# Patient Record
Sex: FEMALE | Race: OTHER
[De-identification: names, ages, dates, MRNs, and addresses within clinical notes are randomized per-mention and may not be internally consistent; named-entity substitution may affect disease eponyms.]

---
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
## 2018-01-01 ENCOUNTER — HOSPITAL ENCOUNTER (EMERGENCY)
Dept: HOSPITAL 62 - ER | Age: 18
Discharge: HOME | End: 2018-01-01
Payer: SELF-PAY

## 2018-01-01 VITALS — DIASTOLIC BLOOD PRESSURE: 69 MMHG | SYSTOLIC BLOOD PRESSURE: 117 MMHG
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
## 2018-01-01 DIAGNOSIS — S62.337A: Primary | ICD-10-CM

## 2018-01-01 PROCEDURE — 99283 EMERGENCY DEPT VISIT LOW MDM: CPT

## 2018-01-01 NOTE — RADIOLOGY REPORT (SQ)
EXAM DESCRIPTION:  HAND LEFT 3 VIEWS



COMPLETED DATE/TIME:  1/1/2018 12:25 pm



REASON FOR STUDY:  punched wall, LMP 12/09/17--might be pregnant



COMPARISON:  None.



EXAM PARAMETERS:  NUMBER OF VIEWS: Three views.

TECHNIQUE: AP, lateral and oblique  radiographic images acquired of the left hand.

LIMITATIONS: None.



FINDINGS:  MINERALIZATION: Normal.

BONES: Mildly displaced and angulated fracture 5th metacarpal head/neck.  Bones otherwise intact.

JOINTS: No effusions.

SOFT TISSUES: Associated soft tissue swelling.

OTHER: No other significant finding.



IMPRESSION:  5TH METACARPAL HEAD/ NECK FRACTURE AS ABOVE.



TECHNICAL DOCUMENTATION:  JOB ID:  [id number]

 2011 [employer]- All Rights Reserved

## 2018-01-01 NOTE — ER DOCUMENT REPORT
ED Hand/Wrist Injury





- General


Chief Complaint: Hand Injury


Stated Complaint: HAND PAIN


Time Seen by Provider: 01/01/18 11:53


Mode of Arrival: Ambulatory


Information source: Patient, [facility] Records


Notes: 





17-year-old female patient reports punching a wall several times about 1 AM 

this morning with her left hand.  She has pain and swelling over the fourth and 

fifth metacarpal heads.  She is concerned about a fracture.  There is swelling 

bruising and pain over the metacarpal heads.


Patient reports her last menstrual period was 12/9/2017 and there is a 

possibility she may have become pregnant.


TRAVEL OUTSIDE OF THE [place] IN LAST 30 DAYS: No





- Related Data


Allergies/Adverse Reactions: 


 





No Known Allergies Allergy (Verified 01/01/18 11:45)


 











Past Medical History





- General


Information source: Patient, [facility] Records





- Social History


Smoking Status: Never Smoker


Cigarette use (# per day): No


Chew tobacco use (# tins/day): No


Smoking Education Provided: No


Frequency of alcohol use: Occasional


Drug Abuse: None


Occupation: Unemployed


Lives with: Friend


Family History: Reviewed & Not Pertinent





- Medical History


Medical History: Negative


Surgical Hx: Negative





Review of Systems





- Review of Systems


Constitutional: No symptoms reported


EENT: No symptoms reported


Cardiovascular: No symptoms reported


Respiratory: No symptoms reported


Gastrointestinal: No symptoms reported


Genitourinary: No symptoms reported


Female Genitourinary: See HPI, Last menstrual period - 12/09/2017


Musculoskeletal: See HPI


Skin: No symptoms reported


Hematologic/Lymphatic: No symptoms reported


Neurological/Psychological: No symptoms reported





Physical Exam





- Vital signs


Vitals: 


 











Temp Pulse Resp BP Pulse Ox


 


 98.4 F   98 H  18   120/75   100 


 


 01/01/18 11:56  01/01/18 11:56  01/01/18 11:56  01/01/18 11:56  01/01/18 11:56














- General


General appearance: Appears well, Alert


In distress: None





- HEENT


Head: Normocephalic, Atraumatic


Eyes: Normal


Pupils: PERRL


Neck: Normal





- Respiratory


Respiratory status: No respiratory distress





- Cardiovascular


Rhythm: Regular


Heart sounds: Normal auscultation


Murmur: No





- Abdominal


Inspection: Normal





- Back


Back: Normal





- Extremities


General upper extremity: Other - The left hand has pain, swelling, ecchymosis 

over the dorsal fourth and fifth metacarpal heads.  There may be some 

depression at the fifth metacarpal head.  X-rays will show if this is the case.


General lower extremity: Normal inspection





- Neurological


Neuro grossly intact: Yes





- Psychological


Associated symptoms: Normal affect, Normal mood





- Skin


Skin Temperature: Warm


Skin Moisture: Dry


Skin Color: Normal





Course





- Re-evaluation


Re-evalutation: 





01/01/18 13:34


The ulnar gutter splint was placed on the left forearm wrist and hand by the 

PCT.  Fits well.  The fingertips have good sensation capillary refill.  Patient 

states it is quite comfortable and makes the fracture feel better.


A sling was placed and that provides support and elevation of the hand and also 

increases the patient's comfort.





- Vital Signs


Vital signs: 


 











Temp Pulse Resp BP Pulse Ox


 


 98.4 F   98 H  18   120/75   100 


 


 01/01/18 11:56  01/01/18 11:56  01/01/18 11:56  01/01/18 11:56  01/01/18 11:56














- Diagnostic Test


Radiology reviewed: Image reviewed, Reports reviewed - Minimally angulated 

fifth metacarpal neck fracture





Discharge





- Discharge


Clinical Impression: 


Boxers fracture


Qualifiers:


 Encounter type: initial encounter Fracture type: closed Qualified Code(s): 

S62.339A - Displaced fracture of neck of unspecified metacarpal bone, initial 

encounter for closed fracture





Condition: Stable


Disposition: HOME, SELF-CARE


Additional Instructions: 


Fractured Fifth Metacarpal (Boxer's)





     You have a fracture of the fifth metacarpal bone in the hand, often called 

a Boxer's Fracture.  The fracture is usually caused by striking the knuckle 

against a hard surface -- such as hitting a wall with the fist.


     This fracture heals well.  Some degree of angle in the fracture is 

perfectly acceptable, resulting in only a slightly rounder knuckle. Your 

physician has determined whether your fracture could benefit from "setting", 

and has outlined a treatment plan for you.


     The usual treatment is splinting for four to six weeks -- a cast is not 

usually necessary.  At first, the injury should be elevated and ice packed.


     Contact the doctor at once if swelling or pain becomes severe, or if 

numbness develops.





////////////////////////////////////////////////////////////////////////////////

////////////////////////////////////////////////////////////////////////////////





Keep the splint clean and dry.


Elevate the hand as much possible.


Take the pain medications as prescribed if Tylenol and ibuprofen does not 

control the pain.


Follow-up with [facility]--call tomorrow for an appointment 

in the next 3-5 days.





RETURN TO THE EMERGENCY ROOM IF ANY NEW OR WORSENING SYMPTOMS.








Prescriptions: 


Hydrocodone/Acetaminophen [Hydrocodon-Acetaminophen 5-325] 1 each PO ASDIR PRN #

15 tablet


 PRN Reason: 


Referrals: 


[facility] [Provider Group] - Follow up in 3-5 days (Call 

on Tuesday for an appointment.)

## 2018-01-20 ENCOUNTER — HOSPITAL ENCOUNTER (EMERGENCY)
Dept: HOSPITAL 62 - ER | Age: 18
Discharge: HOME | End: 2018-01-20
Payer: SELF-PAY

## 2018-01-20 VITALS — DIASTOLIC BLOOD PRESSURE: 66 MMHG | SYSTOLIC BLOOD PRESSURE: 110 MMHG

## 2018-01-20 DIAGNOSIS — Z3A.01: ICD-10-CM

## 2018-01-20 DIAGNOSIS — O20.0: Primary | ICD-10-CM

## 2018-01-20 LAB
ADD MANUAL DIFF: NO
APPEARANCE UR: (no result)
APTT PPP: YELLOW S
BASOPHILS # BLD AUTO: 0 10^3/UL (ref 0–0.2)
BASOPHILS NFR BLD AUTO: 0.6 % (ref 0–2)
BILIRUB UR QL STRIP: NEGATIVE
EOSINOPHIL # BLD AUTO: 0 10^3/UL (ref 0–0.6)
EOSINOPHIL NFR BLD AUTO: 0.7 % (ref 0–6)
ERYTHROCYTE [DISTWIDTH] IN BLOOD BY AUTOMATED COUNT: 13.2 % (ref 11.5–14)
GLUCOSE UR STRIP-MCNC: NEGATIVE MG/DL
HCT VFR BLD CALC: 35.7 % (ref 36–47)
HGB BLD-MCNC: 12 G/DL (ref 12–15.5)
KETONES UR STRIP-MCNC: (no result) MG/DL
LYMPHOCYTES # BLD AUTO: 2 10^3/UL (ref 0.5–4.7)
LYMPHOCYTES NFR BLD AUTO: 29.4 % (ref 13–45)
MCH RBC QN AUTO: 30.3 PG (ref 27–33.4)
MCHC RBC AUTO-ENTMCNC: 33.6 G/DL (ref 32–36)
MCV RBC AUTO: 90 FL (ref 80–97)
MONOCYTES # BLD AUTO: 0.6 10^3/UL (ref 0.1–1.4)
MONOCYTES NFR BLD AUTO: 8.4 % (ref 3–13)
NEUTROPHILS # BLD AUTO: 4.1 10^3/UL (ref 1.7–8.2)
NEUTS SEG NFR BLD AUTO: 60.9 % (ref 42–78)
NITRITE UR QL STRIP: NEGATIVE
PH UR STRIP: 6 [PH] (ref 5–9)
PLATELET # BLD: 349 10^3/UL (ref 150–450)
PROT UR STRIP-MCNC: NEGATIVE MG/DL
RBC # BLD AUTO: 3.96 10^6/UL (ref 3.72–5.28)
SP GR UR STRIP: 1.03
TOTAL CELLS COUNTED % (AUTO): 100 %
UROBILINOGEN UR-MCNC: 4 MG/DL (ref ?–2)
WBC # BLD AUTO: 6.7 10^3/UL (ref 4–10.5)

## 2018-01-20 PROCEDURE — 84702 CHORIONIC GONADOTROPIN TEST: CPT

## 2018-01-20 PROCEDURE — 86900 BLOOD TYPING SEROLOGIC ABO: CPT

## 2018-01-20 PROCEDURE — 85025 COMPLETE CBC W/AUTO DIFF WBC: CPT

## 2018-01-20 PROCEDURE — 36415 COLL VENOUS BLD VENIPUNCTURE: CPT

## 2018-01-20 PROCEDURE — 76817 TRANSVAGINAL US OBSTETRIC: CPT

## 2018-01-20 PROCEDURE — 86901 BLOOD TYPING SEROLOGIC RH(D): CPT

## 2018-01-20 PROCEDURE — 99284 EMERGENCY DEPT VISIT MOD MDM: CPT

## 2018-01-20 PROCEDURE — 81001 URINALYSIS AUTO W/SCOPE: CPT

## 2018-01-20 NOTE — RADIOLOGY REPORT (SQ)
EXAM DESCRIPTION:  U/S OB TRANSVAGINAL W/O DOP



COMPLETED DATE/TIME:  2018 10:03 pm



REASON FOR STUDY:  Vag bleeding,



COMPARISON:  None.



TECHNIQUE:  Transvaginal static and realtime grayscale images acquired of the pelvis. Additional alix
cted spectral and color Doppler images recorded. All images stored on PACs.

bHC,119



LIMITATIONS:  None.



FINDINGS:  FETUS: Living intrauterine pregnancy.

EGA: 5 weeks, 6 days

FHR: 108  beats per minute.

SUBCHORIONIC BLEED: No

SIZE OF BLEED: Not applicable.

UTERUS: No masses. No anomalies.

CERVICAL LENGTH: 3.7 cm   Closed.

RIGHT ADNEXA: Normal ovary with normal vascular flow.

No adnexal free fluid.

No adnexal masses.

LEFT ADNEXA: Ovary not identified.

No adnexal free fluid.

No adnexal masses.

FREE FLUID: A small amount of free fluid is seen within the pelvic cul-de-sac.

OTHER: No other significant finding.



IMPRESSION:  LIVING INTRAUTERINE PREGNANCY.

EGA 5 weeks, 6 days

Trimester of pregnancy:  First - 0 to 13 weeks.



TECHNICAL DOCUMENTATION:  JOB ID:  [id number]

  [employer]- All Rights Reserved

## 2018-01-20 NOTE — ER DOCUMENT REPORT
ED GI/





- General


Chief Complaint: Vag Bleeding, +preg <12wks


Stated Complaint: VAGINAL BLEEDING


Time Seen by Provider: 18 20:12


Mode of Arrival: Ambulatory


Notes: 





17-year-old female patient.  Second pregnancy.  Rh+.  Spotting began yesterday.

  Denies any pain.  Here to find out of her baby is okay.


TRAVEL OUTSIDE OF THE [place] IN LAST 30 DAYS: No





- HPI


Patient complains to provider of: Pregnant


Onset: Yesterday


Timing/Duration: Gradual


Quality of pain: No pain


Severity at maximum: Mild


Severity in ED: Mild


Pain Level: 0





- Related Data


Allergies/Adverse Reactions: 


 





No Known Allergies Allergy (Verified 18 11:45)


 











Past Medical History





- General


Information source: Patient, formerly [facility] Records


Last Menstrual Period: 17





- Social History


Smoking Status: Never Smoker


Chew tobacco use (# tins/day): No


Frequency of alcohol use: Occasional


Drug Abuse: None


Lives with: Spouse/Significant other


Family History: Reviewed & Not Pertinent


Patient has suicidal ideation: No


Patient has homicidal ideation: No





- Past Medical History


Cardiac Medical History: Reports: None


Pulmonary Medical History: Reports: Hx Asthma


EENT Medical History: Reports: None


Neurological Medical History: Reports: None


Endocrine Medical History: Reports: None


Renal/ Medical History: Reports: None.  Denies: Hx Peritoneal Dialysis


Malignancy Medical History: Reports: None


GI Medical History: Reports: None


Musculoskeltal Medical History: Reports None


Skin Medical History: Reports None


Psychiatric Medical History: Reports: None


Traumatic Medical History: Reports: None





Review of Systems





- Review of Systems


Constitutional: No symptoms reported


EENT: No symptoms reported


Cardiovascular: No symptoms reported


Respiratory: No symptoms reported


Gastrointestinal: No symptoms reported


Genitourinary: No symptoms reported


Female Genitourinary: Pregnant, Vaginal bleeding


Musculoskeletal: No symptoms reported


Skin: No symptoms reported


Hematologic/Lymphatic: No symptoms reported


Neurological/Psychological: No symptoms reported





Physical Exam





- Vital signs


Vitals: 


 











Temp Pulse Resp BP Pulse Ox


 


 98.2 F   87   14   127/72 H  99 


 


 18 19:19  18 19:19  18 19:19  18 19:19  18 19:19











Interpretation: Normal





- General


General appearance: Appears well, Alert





- HEENT


Head: Normocephalic, Atraumatic


Eyes: Normal


Pupils: PERRL





- Respiratory


Respiratory status: No respiratory distress


Chest status: Nontender


Breath sounds: Normal


Chest palpation: Normal





- Cardiovascular


Rhythm: Regular


Heart sounds: Normal auscultation


Murmur: No





- Abdominal


Inspection: Normal


Distension: No distension


Bowel sounds: Normal


Tenderness: Nontender


Organomegaly: No organomegaly





- Back


Back: Normal, Nontender





- Extremities


General upper extremity: Normal inspection, Nontender, Normal color, Normal ROM

, Normal temperature


General lower extremity: Normal inspection, Nontender, Normal color, Normal ROM

, Normal temperature, Normal weight bearing.  No: John's sign





- Neurological


Neuro grossly intact: Yes


Cognition: Normal


Orientation: AAOx4


Peter Coma Scale Eye Opening: Spontaneous


Orchard Coma Scale Verbal: Oriented


Orchard Coma Scale Motor: Obeys Commands


Peter Coma Scale Total: 15


Speech: Normal


Motor strength normal: LUE, RUE, LLE, RLE


Sensory: Normal





- Psychological


Associated symptoms: Normal affect, Normal mood





- Skin


Skin Temperature: Warm


Skin Moisture: Dry


Skin Color: Normal





Course





- Re-evaluation


Re-evalutation: 





18 22:10


Is a pleasant well-appearing 17-year-old female in no acute distress with no 

significant abdominal pain.  Rh+ based on labs.


18 22:11


Ultrasound shows intrauterine pregnancy.  6 weeks.  Positive fetal heart 

movement.  Strict warning signs were given regarding threatened miscarriage.  

Has OB follow-up.  Will DC at this time.


18 22:25





Laboratory











  18





  20:26 20:26 20:26


 


WBC  6.7  


 


RBC  3.96  


 


Hgb  12.0  


 


Hct  35.7 L  


 


MCV  90  


 


MCH  30.3  


 


MCHC  33.6  


 


RDW  13.2  


 


Plt Count  349  


 


Seg Neutrophils %  60.9  


 


Lymphocytes %  29.4  


 


Monocytes %  8.4  


 


Eosinophils %  0.7  


 


Basophils %  0.6  


 


Absolute Neutrophils  4.1  


 


Absolute Lymphocytes  2.0  


 


Absolute Monocytes  0.6  


 


Absolute Eosinophils  0.0  


 


Absolute Basophils  0.0  


 


Beta HCG, Quant    98662.00 H


 


Total Beta HCG    POSITIVE


 


Urine Color   


 


Urine Appearance   


 


Urine pH   


 


Ur Specific Gravity   


 


Urine Protein   


 


Urine Glucose (UA)   


 


Urine Ketones   


 


Urine Blood   


 


Urine Nitrite   


 


Urine Bilirubin   


 


Urine Urobilinogen   


 


Ur Leukocyte Esterase   


 


Urine WBC (Auto)   


 


Squamous Epi Cells Auto   


 


Amorphous Sediment Auto   


 


Urine Mucus (Auto)   


 


Urine Ascorbic Acid   


 


Blood Type   A POSITIVE 


 


Rhogam Indicated   RHOGAM NOT INDICATED 














  18





  20:26


 


WBC 


 


RBC 


 


Hgb 


 


Hct 


 


MCV 


 


MCH 


 


MCHC 


 


RDW 


 


Plt Count 


 


Seg Neutrophils % 


 


Lymphocytes % 


 


Monocytes % 


 


Eosinophils % 


 


Basophils % 


 


Absolute Neutrophils 


 


Absolute Lymphocytes 


 


Absolute Monocytes 


 


Absolute Eosinophils 


 


Absolute Basophils 


 


Beta HCG, Quant 


 


Total Beta HCG 


 


Urine Color  YELLOW


 


Urine Appearance  SLIGHTLY-CLOUDY


 


Urine pH  6.0


 


Ur Specific Gravity  1.030


 


Urine Protein  NEGATIVE


 


Urine Glucose (UA)  NEGATIVE


 


Urine Ketones  TRACE H


 


Urine Blood  NEGATIVE


 


Urine Nitrite  NEGATIVE


 


Urine Bilirubin  NEGATIVE


 


Urine Urobilinogen  4.0 H


 


Ur Leukocyte Esterase  NEGATIVE


 


Urine WBC (Auto)  1


 


Squamous Epi Cells Auto  4


 


Amorphous Sediment Auto  TRACE


 


Urine Mucus (Auto)  RARE


 


Urine Ascorbic Acid  NEGATIVE


 


Blood Type 


 


Rhogam Indicated 














 





Obstetrics Ultrasound  18 20:17


IMPRESSION:  LIVING INTRAUTERINE PREGNANCY.


EGA 5 weeks, 6 days


Trimester of pregnancy:  First - 0 to 13 weeks.


 














- Vital Signs


Vital signs: 


 











Temp Pulse Resp BP Pulse Ox


 


 98.2 F   87   14   127/72 H  99 


 


 18 19:19  18 19:19  18 19:19  18 19:19  18 19:19














- Laboratory


Result Diagrams: 


 18 20:26





Laboratory results interpreted by me: 


 











  18





  20:26 20:26 20:26


 


Hct  35.7 L  


 


Beta HCG, Quant   89638.00 H 


 


Urine Ketones    TRACE H


 


Urine Urobilinogen    4.0 H














Discharge





- Discharge


Clinical Impression: 


 Threatened  in early pregnancy





Condition: Good


Disposition: HOME, SELF-CARE


Instructions:  Bleeding During Early Pregnancy ([facility]), Pregnancy ([facility]), 

Threatened Miscarriage ([facility])


Additional Instructions: 


As instructed, return to the emergency department if soaking more than 2 pads 

per hour for 2 hours, severe pain, feeling like you are going to pass out or 

for any other concerns.


Referrals: 


RICO ANDREA MD [ACTIVE STAFF] - Follow up in 3-5 days

## 2018-01-20 NOTE — ER DOCUMENT REPORT
ED Medical Screen (RME)





- General


Chief Complaint: Vag Bleeding, +preg <12wks


Stated Complaint: VAGINAL BLEEDING


Time Seen by Provider: 18 20:12


Mode of Arrival: Ambulatory


Information source: Patient


Notes: 





17-year-old female who is 6 week pregnant that presents today with complaints 

of lower abdominal cramping.  Patient states she has had vaginal bleeding 

yesterday which increased today but it is still less than a normal period.  

Patient states her last menstrual period was on .  Patient is .  Patient denies any nausea, vomiting, diarrhea, fevers, or passing blood 

clots.


TRAVEL OUTSIDE OF THE [place] IN LAST 30 DAYS: No





- Related Data


Allergies/Adverse Reactions: 


 





No Known Allergies Allergy (Verified 18 11:45)


 











Past Medical History





- General


Information source: [facility] Records


Renal/ Medical History: Denies: Hx Peritoneal Dialysis





Review of Systems





- Review of Systems


Constitutional: denies: Fever


Gastrointestinal: See HPI, Abdominal pain.  denies: Diarrhea, Nausea, Vomiting


Female Genitourinary: See HPI, Pregnant





Physical Exam





- Vital signs


Vitals: 





 











Temp Pulse Resp BP Pulse Ox


 


 98.2 F   87   14   127/72 H  99 


 


 18 19:19  18 19:19  18 19:19  18 19:19  18 19:19














- General


General appearance: Appears well


In distress: None





- HEENT


Head: Normocephalic, Atraumatic


Eyes: Normal


Conjunctiva: Normal


Cornea: Normal





- Respiratory


Respiratory status: No respiratory distress


Breath sounds: Normal





- Cardiovascular


Rhythm: Regular


Heart sounds: Normal auscultation


Murmur: No





- Abdominal


Inspection: Normal


Distension: No distension


Bowel sounds: Normal





Course





- Vital Signs


Vital signs: 





 











Temp Pulse Resp BP Pulse Ox


 


 98.2 F   87   14   127/72 H  99 


 


 18 19:19  18 19:19  18 19:19  18 19:19  18 19:19














Scribe Documentation





- Scribe


Written by Scribe:: Bryan Rhoades, 2018


acting as scribe for :: [employer]

## 2018-02-17 ENCOUNTER — HOSPITAL ENCOUNTER (EMERGENCY)
Dept: HOSPITAL 62 - ER | Age: 18
LOS: 1 days | Discharge: HOME | End: 2018-02-18
Payer: MEDICAID

## 2018-02-17 DIAGNOSIS — Z3A.12: ICD-10-CM

## 2018-02-17 DIAGNOSIS — O20.9: Primary | ICD-10-CM

## 2018-02-17 LAB
ADD MANUAL DIFF: NO
BASOPHILS # BLD AUTO: 0 10^3/UL (ref 0–0.2)
BASOPHILS NFR BLD AUTO: 0.6 % (ref 0–2)
EOSINOPHIL # BLD AUTO: 0.1 10^3/UL (ref 0–0.6)
EOSINOPHIL NFR BLD AUTO: 0.8 % (ref 0–6)
ERYTHROCYTE [DISTWIDTH] IN BLOOD BY AUTOMATED COUNT: 13.8 % (ref 11.5–14)
HCT VFR BLD CALC: 36.6 % (ref 36–47)
HGB BLD-MCNC: 12.6 G/DL (ref 12–15.5)
LYMPHOCYTES # BLD AUTO: 2.2 10^3/UL (ref 0.5–4.7)
LYMPHOCYTES NFR BLD AUTO: 30.2 % (ref 13–45)
MCH RBC QN AUTO: 31.5 PG (ref 27–33.4)
MCHC RBC AUTO-ENTMCNC: 34.3 G/DL (ref 32–36)
MCV RBC AUTO: 92 FL (ref 80–97)
MONOCYTES # BLD AUTO: 0.6 10^3/UL (ref 0.1–1.4)
MONOCYTES NFR BLD AUTO: 7.8 % (ref 3–13)
NEUTROPHILS # BLD AUTO: 4.4 10^3/UL (ref 1.7–8.2)
NEUTS SEG NFR BLD AUTO: 60.6 % (ref 42–78)
PLATELET # BLD: 303 10^3/UL (ref 150–450)
RBC # BLD AUTO: 3.99 10^6/UL (ref 3.72–5.28)
TOTAL CELLS COUNTED % (AUTO): 100 %
WBC # BLD AUTO: 7.3 10^3/UL (ref 4–10.5)

## 2018-02-17 PROCEDURE — 86900 BLOOD TYPING SEROLOGIC ABO: CPT

## 2018-02-17 PROCEDURE — 85025 COMPLETE CBC W/AUTO DIFF WBC: CPT

## 2018-02-17 PROCEDURE — 87210 SMEAR WET MOUNT SALINE/INK: CPT

## 2018-02-17 PROCEDURE — 87591 N.GONORRHOEAE DNA AMP PROB: CPT

## 2018-02-17 PROCEDURE — 87491 CHLMYD TRACH DNA AMP PROBE: CPT

## 2018-02-17 PROCEDURE — 84702 CHORIONIC GONADOTROPIN TEST: CPT

## 2018-02-17 PROCEDURE — 86901 BLOOD TYPING SEROLOGIC RH(D): CPT

## 2018-02-17 PROCEDURE — 36415 COLL VENOUS BLD VENIPUNCTURE: CPT

## 2018-02-17 PROCEDURE — 99284 EMERGENCY DEPT VISIT MOD MDM: CPT

## 2018-02-17 NOTE — ER DOCUMENT REPORT
ED Medical Screen (RME)





- General


Mode of Arrival: Ambulatory


Information source: Patient





<BERENICE SAMPSON - Last Filed: 18 00:58>





- General


Mode of Arrival: Ambulatory


Information source: Patient


TRAVEL OUTSIDE OF THE [place] IN LAST 30 DAYS: No





- HPI


Onset: Just prior to arrival


Onset/Duration: Sudden


Quality of pain: Cramping - VERY SLIGHT


Severity: Mild


Associated Symptoms: None


Exacerbated by: Denies


Relieved by: Denies


Similar symptoms previously: No


Recently seen / treated by doctor: No





- Related Data


Smoking: Non-smoker


Frequency of alcohol use: None


Drug Abuse: None





<HAMILTON OLIVEIRA - Last Filed: 18 19:37>





- General


Chief Complaint: Vag Bleeding, +preg <12wks


Stated Complaint: VAGINAL BLEEDING


Time Seen by Provider: 18 20:28





- HPI


Notes: 





18 00:10


17-year-old who is  at approximately 12 weeks gestation who presented 

today for evaluation of spotting.  Her spotting started today.  Patient denies 

any abdominal pain or dizziness.  Patient denies any passage of clots or 

tissue.  Patient had similar symptoms approximately 10 days ago and had 

ultrasound that revealed intrauterine pregnancy.  Patient denies any vaginal 

discharge.  Patient unsure of her group type and Rh factor.  Bleeding is 

stopped at present time.  Patient did not establish care with OB/GYN at present 

time. (ADRIÁNBERENICE JACOB)





- Related Data


Allergies/Adverse Reactions: 


 





No Known Allergies Allergy (Verified 18 11:45)


 











Past Medical History





- General


Information source: Patient


Last Menstrual Period: 17





- Social History


Cigarette use (# per day): No


Chew tobacco use (# tins/day): No


Frequency of alcohol use: None


Drug Abuse: None


Lives with: Spouse/Significant other


Family history: None





- Past Medical History


Cardiac Medical History: Reports: None


Pulmonary Medical History: Reports: Hx Asthma


EENT Medical History: Reports: None


Neurological Medical History: Reports: None


Endocrine Medical History: Reports: None


Renal/ Medical History: Reports: None.  Denies: Hx Peritoneal Dialysis


Malignancy Medical History: Reports: None


GI Medical History: Reports: None


Musculoskeltal Medical History: Reports None


Psychiatric Medical History: Reports: None


Surgical Hx: Negative





<HAMILTON OLIVEIRA - Last Filed: 18 19:37>





Review of Systems





<BERENICE SAMPSON - Last Filed: 18 00:58>





- Review of Systems


Constitutional: No symptoms reported


EENT: No symptoms reported


Cardiovascular: No symptoms reported


Gastrointestinal: No symptoms reported


Genitourinary: No symptoms reported


Female Genitourinary: See HPI, Pregnant


Musculoskeletal: No symptoms reported


Skin: No symptoms reported


Neurological/Psychological: No symptoms reported





<HAMILTON OLIVEIRA - Last Filed: 18 19:37>





- Review of Systems


Notes: 





REVIEW OF SYSTEMS:


 CONSTITUTIONAL: -fevers, -chills


 EENT: -eye pain, -difficulty swallowing, -nasal congestion


 CARDIOVASCULAR: -chest pain, -syncope.


 RESPIRATORY: -cough, -SOB


 GASTROINTESTINAL: -abdominal pain, -nausea, -vomiting, -diarrhea


 GENITOURINARY: -dysuria, -hematuria, + vaginal bleeding


 MUSCULOSKELETAL: -back pain, -neck pain


 SKIN: -rash or skin lesions.


 HEMATOLOGIC: -easy bruising or bleeding.


 LYMPHATIC: -swollen, enlarged glands.


 NEUROLOGICAL: -altered mental status or loss of consciousness, -headache, -

neurologic symptoms


 PSYCHIATRIC: -anxiety, -depression.


 ALL OTHER SYSTEMS REVIEWED AND NEGATIVE. (BERENICE SAMPSON)





Physical Exam





<BERENICE SAMPSON - Last Filed: 18 00:58>





- Vital signs


Interpretation: Normal.  No: Hypotensive, Tachycardic, Tachypneic





- General


General appearance: Appears well, Alert


In distress: None





- HEENT


Head: Normocephalic


Eyes: Normal


Conjunctiva: Normal


Ears: Normal


Nasal: Normal


Mouth/Lips: Normal


Mucous membranes: Normal





- Respiratory


Respiratory status: No respiratory distress





- Cardiovascular


Rhythm: Regular





- Abdominal


Inspection: Normal





- Extremities


General upper extremity: Normal inspection


General lower extremity: Normal inspection





- Neurological


Neuro grossly intact: Yes


Cognition: Normal


Orientation: AAOx4





- Psychological


Associated symptoms: Normal affect, Normal mood





- Skin


Skin Temperature: Warm


Skin Moisture: Dry


Skin Color: Normal


Skin Turgor: Elastic





<HAMILTON OLIVEIRA - Last Filed: 18 19:37>





- Vital signs


Vitals: 


 











Temp Pulse Resp BP Pulse Ox


 


 98.4 F   73   18   116/66   100 


 


 18 19:38  18 19:38  18 19:38  18 19:38  18 19:38














- Notes


Notes: 





Reviewed vital signs and nursing note as charted by RN. 


CONSTITUTIONAL: Alert and oriented and responds appropriately to questions


HEAD: Normocephalic; atraumatic


EYES: PERRL; Conjunctivae clear, sclerae non-icteric


ENT: normal nose


NECK: Supple 


CARD: Regular rate and rhythm; no murmurs, no clicks, no rubs, no gallops; 

symmetric distal pulses


RESP: Normal chest excursion without splinting or tachypnea; breath sounds 

clear and equal bilaterally


ABD/GI: Normal bowel sounds; non-distended; soft, no tenderness to palpation


:Vaginal examination was performed with chaperone in the room, Patient has 

normal genitalia, intravaginal examination with no clots or tissue noted from 

the os, no CMT tenderness, no vaginal bleeding, no foreign bodies


BACK:  The back appears normal and is non-tender to palpation


EXT: Normal ROM in all joints; non-tender to palpation; no cyanosis, no 

effusions, no edema   


SKIN: Normal color for age and race; warm; dry; good turgor; capillary refill < 

2 seconds; no acute lesions noted


NEURO: .Cranial nerves 3-12 intact. Motor strength 5/5 bilaterally. Sensation 

intact to touch bilaterally. No pronator drift. Finger to nose intact 

bilaterally


PSYCH: The patient's mood and manner are appropriate. Grooming and personal 

hygiene are appropriate.


 (BERENICE SAMPSON)





Course





- Laboratory


Result Diagrams: 


 18 20:42








<BERENICE SAMPSON - Last Filed: 18 00:58>





- Laboratory


Result Diagrams: 


 18 20:42








<HAMILTON OLIVEIRA - Last Filed: 18 19:37>





- Re-evaluation


Re-evalutation: 





18 00:12


17-year-old who is  at 12  weeks gestation presented today for evaluation 

of vaginal spotting


Differential diagnosis includes vaginal bleeding in first trimester, miscarriage

, anemia, sexually transmitted infection


Patient had ultrasound with this pregnancy that revealed intrauterine gestation


Bedside ultrasound confirmed intrauterine pregnancy  with fetal heart tones of 

150


We will obtain basic lab work including CBC, beta quant as well as RhoGam workup


We will send off a wet prep as well as GC swab


Reassess patient


18 00:58


Patient is doing well, patient is Rh+ therefore no need for RhoGam


Wet prep with no trichomonas


GC was sent


No more bleeding


Discussed results of imaging and lab work and workup with patient, agree with 

disposition today


Patient will follow up with OB/GYN next week (BERENICE SAMPSON)





- Vital Signs


Vital signs: 


 











Temp Pulse Resp BP Pulse Ox


 


 98.3 F   72   16   123/55 L  100 


 


 18 01:05  18 01:05  18 01:05  18 01:05  18 01:05














- Laboratory


Laboratory results interpreted by me: 


 











  18





  20:42


 


Beta HCG, Quant  226203.00 H














Doctor's Discharge





<BERENICE SAMPSON - Last Filed: 18 00:58>





<HAMILTON OLIVEIRA - Last Filed: 18 19:37>





- Discharge


Clinical Impression: 


 Vaginal bleeding in pregnant patient at less than 20 weeks gestation





Condition: Stable


Disposition: HOME, SELF-CARE


Instructions:  Vaginal Bleeding ([facility])


Additional Instructions: 


Please follow-up with your OB/GYN doctors


Come back if you have worsening abdominal pain, fevers, vaginal bleeding, 

tissue passing from the vagina

## 2018-02-18 VITALS — DIASTOLIC BLOOD PRESSURE: 55 MMHG | SYSTOLIC BLOOD PRESSURE: 123 MMHG

## 2018-02-18 LAB
CHLAM PCR: NOT DETECTED
EPITHELIALS (WET MOUNT): (no result)
GON PCR: NOT DETECTED
RBCS (WET MOUNT): (no result)
T.VAGINALIS (WET MOUNT): (no result)
WBCS (WET MOUNT): (no result)
YEAST (WET MOUNT): (no result)

## 2018-11-20 ENCOUNTER — HOSPITAL ENCOUNTER (OUTPATIENT)
Dept: HOSPITAL 62 - LAB | Age: 18
End: 2018-11-20
Attending: NURSE PRACTITIONER
Payer: MEDICAID

## 2018-11-20 DIAGNOSIS — R10.2: ICD-10-CM

## 2018-11-20 DIAGNOSIS — R30.0: Primary | ICD-10-CM

## 2018-11-20 LAB
BACTERIA (WET MOUNT): (no result)
CHLAM PCR: NOT DETECTED
EPITHELIALS (WET MOUNT): (no result)
GON PCR: NOT DETECTED
RBCS (WET MOUNT): (no result)
T.VAGINALIS (WET MOUNT): (no result)
WBCS (WET MOUNT): (no result)
YEAST (WET MOUNT): (no result)

## 2018-11-20 PROCEDURE — 87491 CHLMYD TRACH DNA AMP PROBE: CPT

## 2018-11-20 PROCEDURE — 87210 SMEAR WET MOUNT SALINE/INK: CPT

## 2018-11-20 PROCEDURE — 87591 N.GONORRHOEAE DNA AMP PROB: CPT

## 2020-09-13 ENCOUNTER — HOSPITAL ENCOUNTER (EMERGENCY)
Dept: HOSPITAL 62 - ER | Age: 20
Discharge: HOME | End: 2020-09-13
Payer: SELF-PAY

## 2020-09-13 VITALS — DIASTOLIC BLOOD PRESSURE: 67 MMHG | SYSTOLIC BLOOD PRESSURE: 111 MMHG

## 2020-09-13 DIAGNOSIS — M54.5: Primary | ICD-10-CM

## 2020-09-13 DIAGNOSIS — J45.909: ICD-10-CM

## 2020-09-13 DIAGNOSIS — R10.9: ICD-10-CM

## 2020-09-13 DIAGNOSIS — Z87.440: ICD-10-CM

## 2020-09-13 LAB
ADD MANUAL DIFF: NO
ALBUMIN SERPL-MCNC: 4.7 G/DL (ref 3.5–5)
ALP SERPL-CCNC: 49 U/L (ref 38–126)
ANION GAP SERPL CALC-SCNC: 10 MMOL/L (ref 5–19)
APPEARANCE UR: CLEAR
APTT PPP: YELLOW S
AST SERPL-CCNC: 20 U/L (ref 14–36)
BASOPHILS # BLD AUTO: 0.1 10^3/UL (ref 0–0.2)
BASOPHILS NFR BLD AUTO: 1.2 % (ref 0–2)
BILIRUB DIRECT SERPL-MCNC: 0.2 MG/DL (ref 0–0.4)
BILIRUB SERPL-MCNC: 0.8 MG/DL (ref 0.2–1.3)
BILIRUB UR QL STRIP: NEGATIVE
BUN SERPL-MCNC: 10 MG/DL (ref 7–20)
CALCIUM: 9.2 MG/DL (ref 8.4–10.2)
CHLORIDE SERPL-SCNC: 105 MMOL/L (ref 98–107)
CO2 SERPL-SCNC: 26 MMOL/L (ref 22–30)
EOSINOPHIL # BLD AUTO: 0.1 10^3/UL (ref 0–0.6)
EOSINOPHIL NFR BLD AUTO: 1.6 % (ref 0–6)
ERYTHROCYTE [DISTWIDTH] IN BLOOD BY AUTOMATED COUNT: 12.7 % (ref 11.5–14)
GLUCOSE SERPL-MCNC: 117 MG/DL (ref 75–110)
GLUCOSE UR STRIP-MCNC: NEGATIVE MG/DL
HCT VFR BLD CALC: 35.6 % (ref 36–47)
HGB BLD-MCNC: 12.9 G/DL (ref 12–15.5)
KETONES UR STRIP-MCNC: NEGATIVE MG/DL
LYMPHOCYTES # BLD AUTO: 1.8 10^3/UL (ref 0.5–4.7)
LYMPHOCYTES NFR BLD AUTO: 41 % (ref 13–45)
MCH RBC QN AUTO: 32.6 PG (ref 27–33.4)
MCHC RBC AUTO-ENTMCNC: 36.1 G/DL (ref 32–36)
MCV RBC AUTO: 91 FL (ref 80–97)
MONOCYTES # BLD AUTO: 0.4 10^3/UL (ref 0.1–1.4)
MONOCYTES NFR BLD AUTO: 9 % (ref 3–13)
NEUTROPHILS # BLD AUTO: 2 10^3/UL (ref 1.7–8.2)
NEUTS SEG NFR BLD AUTO: 47.2 % (ref 42–78)
NITRITE UR QL STRIP: NEGATIVE
PH UR STRIP: 6 [PH] (ref 5–9)
PLATELET # BLD: 287 10^3/UL (ref 150–450)
POTASSIUM SERPL-SCNC: 4.5 MMOL/L (ref 3.6–5)
PROT SERPL-MCNC: 7.6 G/DL (ref 6.3–8.2)
PROT UR STRIP-MCNC: NEGATIVE MG/DL
RBC # BLD AUTO: 3.94 10^6/UL (ref 3.72–5.28)
SP GR UR STRIP: 1.02
TOTAL CELLS COUNTED % (AUTO): 100 %
UROBILINOGEN UR-MCNC: NEGATIVE MG/DL (ref ?–2)
WBC # BLD AUTO: 4.3 10^3/UL (ref 4–10.5)

## 2020-09-13 PROCEDURE — 84703 CHORIONIC GONADOTROPIN ASSAY: CPT

## 2020-09-13 PROCEDURE — 76770 US EXAM ABDO BACK WALL COMP: CPT

## 2020-09-13 PROCEDURE — 83690 ASSAY OF LIPASE: CPT

## 2020-09-13 PROCEDURE — 81001 URINALYSIS AUTO W/SCOPE: CPT

## 2020-09-13 PROCEDURE — 85025 COMPLETE CBC W/AUTO DIFF WBC: CPT

## 2020-09-13 PROCEDURE — 36415 COLL VENOUS BLD VENIPUNCTURE: CPT

## 2020-09-13 PROCEDURE — 80053 COMPREHEN METABOLIC PANEL: CPT

## 2020-09-13 PROCEDURE — 99284 EMERGENCY DEPT VISIT MOD MDM: CPT

## 2020-09-13 NOTE — ER DOCUMENT REPORT
ED GI/





- General


Chief Complaint: Flank Pain


Stated Complaint: FLANK PAIN


Time Seen by Provider: 09/13/20 09:43


Primary Care Provider: 


YASEMIN GRIFFIN NP [NURSE PRACTITIONER] - Follow up as needed


Mode of Arrival: Ambulatory


Information source: Patient


Notes: 





Otherwise healthy 20-year-old female presents emergency department with 3-month 

history of intermittent low back and flank pain.  Patient reports the pain is on

both sides, she states it comes and goes, she states it feels sharp and stabbing

when it is present.  She thinks it might be something to do with her kidneys.  

She denies any history of previous kidney issues, she does report that she has 

frequent urinary tract infections, she states she has no medical insurance so 

she buys medications off of [facility] for her symptoms.  She has not had fever, 

chills, nausea, vomiting, diarrhea.  She denies any pelvic pain or abnormal 

vaginal discharge.





TRAVEL OUTSIDE OF THE [place] IN LAST 30 DAYS: No





- Related Data


Allergies/Adverse Reactions: 


                                        





No Known Allergies Allergy (Verified 09/13/20 11:02)


   











Past Medical History





- General


Information source: Patient





- Social History


Smoking Status: Never Smoker


Frequency of alcohol use: None


Drug Abuse: None


Family History: Reviewed & Not Pertinent


Pulmonary Medical History: Reports: Hx Asthma


Renal/ Medical History: Denies: Hx Peritoneal Dialysis


Surgical Hx: Negative





- Immunizations


Immunizations up to date: Yes





Review of Systems





- Review of Systems


Musculoskeletal: See HPI


-: Yes All other systems reviewed and negative





Physical Exam





- Vital signs


Vitals: 


                                        











Temp Pulse Resp BP Pulse Ox


 


 98.2 F   74   18   111/67   99 


 


 09/13/20 09:29  09/13/20 09:29  09/13/20 09:29  09/13/20 09:29  09/13/20 09:29














- Notes


Notes: 





PHYSICAL EXAMINATION:





GENERAL: Well-appearing, well-nourished and in no acute distress.





HEAD: Atraumatic, normocephalic.





EYES: Pupils equal round and reactive to light, extraocular movements intact, 

conjunctiva are normal.





ENT: Nares patent, oropharynx clear without exudates.  Moist mucous membranes.





NECK: Normal range of motion, supple without lymphadenopathy





LUNGS: Breath sounds clear to auscultation bilaterally and equal.  No wheezes 

rales or rhonchi.





HEART: Regular rate and rhythm without murmurs





ABDOMEN: Soft, nontender, nondistended abdomen.  No guarding, no rebound.  No 

masses appreciated.





Female : No CVA tenderness.





Musculoskeletal: Normal range of motion, no pitting or edema.  No cyanosis.





NEUROLOGICAL: Cranial nerves grossly intact.  Normal speech, normal gait.  

Normal sensory, motor exams





PSYCH: Normal mood, normal affect.





SKIN: Warm, Dry, normal turgor, no rashes or lesions noted.





Course





- Re-evaluation


Re-evalutation: 








Laboratory











  09/13/20 09/13/20 09/13/20





  10:06 10:06 10:06


 


WBC  4.3  


 


RBC  3.94  


 


Hgb  12.9  


 


Hct  35.6 L  


 


MCV  91  


 


MCH  32.6  


 


MCHC  36.1 H  


 


RDW  12.7  


 


Plt Count  287  


 


Lymph % (Auto)  41.0  


 


Mono % (Auto)  9.0  


 


Eos % (Auto)  1.6  


 


Baso % (Auto)  1.2  


 


Absolute Neuts (auto)  2.0  


 


Absolute Lymphs (auto)  1.8  


 


Absolute Monos (auto)  0.4  


 


Absolute Eos (auto)  0.1  


 


Absolute Basos (auto)  0.1  


 


Seg Neutrophils %  47.2  


 


Sodium   140.7 


 


Potassium   4.5 


 


Chloride   105 


 


Carbon Dioxide   26 


 


Anion Gap   10 


 


BUN   10 


 


Creatinine   0.61 


 


Est GFR ( Amer)   > 60 


 


Est GFR (MDRD) Non-Af   > 60 


 


Glucose   117 H 


 


Calcium   9.2 


 


Total Bilirubin   0.8 


 


Direct Bilirubin   0.2 


 


Neonat Total Bilirubin   Not Reportable 


 


Neonat Direct Bilirubin   Not Reportable 


 


Neonat Indirect Bili   Not Reportable 


 


AST   20 


 


ALT   11 


 


Alkaline Phosphatase   49 


 


Total Protein   7.6 


 


Albumin   4.7 


 


Lipase   99.4 


 


Serum HCG, Qual    NEGATIVE


 


Urine Color   


 


Urine Appearance   


 


Urine pH   


 


Ur Specific Gravity   


 


Urine Protein   


 


Urine Glucose (UA)   


 


Urine Ketones   


 


Urine Blood   


 


Urine Nitrite   


 


Urine Bilirubin   


 


Urine Urobilinogen   


 


Ur Leukocyte Esterase   


 


Urine WBC (Auto)   


 


Urine RBC (Auto)   


 


Urine Bacteria (Auto)   


 


Squamous Epi Cells Auto   


 


Urine Mucus (Auto)   


 


Urine Ascorbic Acid   














  09/13/20





  10:06


 


WBC 


 


RBC 


 


Hgb 


 


Hct 


 


MCV 


 


MCH 


 


MCHC 


 


RDW 


 


Plt Count 


 


Lymph % (Auto) 


 


Mono % (Auto) 


 


Eos % (Auto) 


 


Baso % (Auto) 


 


Absolute Neuts (auto) 


 


Absolute Lymphs (auto) 


 


Absolute Monos (auto) 


 


Absolute Eos (auto) 


 


Absolute Basos (auto) 


 


Seg Neutrophils % 


 


Sodium 


 


Potassium 


 


Chloride 


 


Carbon Dioxide 


 


Anion Gap 


 


BUN 


 


Creatinine 


 


Est GFR ( Amer) 


 


Est GFR (MDRD) Non-Af 


 


Glucose 


 


Calcium 


 


Total Bilirubin 


 


Direct Bilirubin 


 


Neonat Total Bilirubin 


 


Neonat Direct Bilirubin 


 


Neonat Indirect Bili 


 


AST 


 


ALT 


 


Alkaline Phosphatase 


 


Total Protein 


 


Albumin 


 


Lipase 


 


Serum HCG, Qual 


 


Urine Color  YELLOW


 


Urine Appearance  CLEAR


 


Urine pH  6.0


 


Ur Specific Gravity  1.018


 


Urine Protein  NEGATIVE


 


Urine Glucose (UA)  NEGATIVE


 


Urine Ketones  NEGATIVE


 


Urine Blood  NEGATIVE


 


Urine Nitrite  NEGATIVE


 


Urine Bilirubin  NEGATIVE


 


Urine Urobilinogen  NEGATIVE


 


Ur Leukocyte Esterase  NEGATIVE


 


Urine WBC (Auto)  1


 


Urine RBC (Auto)  2


 


Urine Bacteria (Auto)  TRACE


 


Squamous Epi Cells Auto  4


 


Urine Mucus (Auto)  RARE


 


Urine Ascorbic Acid  NEGATIVE














- Vital Signs


Vital signs: 


                                        











Temp Pulse Resp BP Pulse Ox


 


 98.2 F   74   18   111/67   99 


 


 09/13/20 09:29  09/13/20 09:29  09/13/20 09:29  09/13/20 09:29  09/13/20 09:29














- Laboratory


Result Diagrams: 


                                 09/13/20 10:06





                                 09/13/20 10:06


Laboratory results interpreted by me: 


                                        











  09/13/20 09/13/20





  10:06 10:06


 


Hct  35.6 L 


 


MCHC  36.1 H 


 


Glucose   117 H














- Diagnostic Test


Radiology reviewed: Reports reviewed





Discharge





- Discharge


Clinical Impression: 


Low back pain


Qualifiers:


 Chronicity: chronic Back pain laterality: bilateral Sciatica presence: without 

sciatica Qualified Code(s): M54.5 - Low back pain





Condition: Stable


Disposition: HOME, SELF-CARE


Additional Instructions: 


Your work-up today was reassuring.  Here blood work, urine and ultrasound were 

all normal.  There is no evidence of any issue going on with your kidneys.  

There is no evidence of kidney infection or urinary tract infection.  I believe 

your pain may be more muscular.  We will try you out on a muscle relaxer.  You 

may also try taking ibuprofen 600 mg every 6 hours.  You may follow-up with the 

[facility], call them to try to set up an appointment.  This clinic

is for people they do not have insurance.  Their phone number is in your 

discharge packet.  Return to the emergency department with worsening symptoms to

include development of fever greater than 101, persistent and worsening pain, 

vomiting or any other concerning symptom.





Prescriptions: 


Methocarbamol [Robaxin 750 mg Tablet] 750 mg PO Q6HP PRN #30 tablet


 PRN Reason: 


Forms:  Return to Work


Referrals: 


YASEMIN GRIFFIN NP [NURSE PRACTITIONER] - Follow up as needed

## 2020-09-13 NOTE — RADIOLOGY REPORT (SQ)
EXAM DESCRIPTION:  U/S RETROPERITON (RENAL/AORTA)



IMAGES COMPLETED DATE/TIME:  9/13/2020 10:23 am



REASON FOR STUDY:  bilateral flank pain



COMPARISON:  None.



TECHNIQUE:  Dynamic and static grayscale images acquired of the kidneys and bladder and recorded on P
ACS. Additional selected color Doppler and spectral images recorded.



LIMITATIONS:  None.



FINDINGS:  RIGHT KIDNEY: Normal size. Normal echogenicity. No solid or suspicious masses. No hydronep
hrosis. No calcifications.

LEFT KIDNEY:  Normal size. Normal echogenicity. No solid or suspicious masses. No hydronephrosis. No 
calcifications.

BLADDER: Not visualized given recent voiding.

OTHER FINDINGS: No other significant finding.



IMPRESSION:  Unremarkable sonographic appearance of the kidneys.

Nonvisualization of the urinary bladder.



TECHNICAL DOCUMENTATION:  JOB ID:  [id number]

 2011 [employer]- All Rights Reserved



Reading location - IP/workstation name: [email or web address]